# Patient Record
Sex: MALE | Race: WHITE | NOT HISPANIC OR LATINO | Employment: FULL TIME | URBAN - METROPOLITAN AREA
[De-identification: names, ages, dates, MRNs, and addresses within clinical notes are randomized per-mention and may not be internally consistent; named-entity substitution may affect disease eponyms.]

---

## 2022-05-19 ENCOUNTER — OFFICE VISIT (OUTPATIENT)
Dept: FAMILY MEDICINE | Facility: CLINIC | Age: 32
End: 2022-05-19
Payer: COMMERCIAL

## 2022-05-19 ENCOUNTER — LAB VISIT (OUTPATIENT)
Dept: LAB | Facility: HOSPITAL | Age: 32
End: 2022-05-19
Attending: FAMILY MEDICINE
Payer: COMMERCIAL

## 2022-05-19 ENCOUNTER — PATIENT MESSAGE (OUTPATIENT)
Dept: FAMILY MEDICINE | Facility: CLINIC | Age: 32
End: 2022-05-19

## 2022-05-19 VITALS
WEIGHT: 268.75 LBS | DIASTOLIC BLOOD PRESSURE: 72 MMHG | BODY MASS INDEX: 34.49 KG/M2 | SYSTOLIC BLOOD PRESSURE: 130 MMHG | HEART RATE: 82 BPM | HEIGHT: 74 IN | OXYGEN SATURATION: 96 %

## 2022-05-19 DIAGNOSIS — Z00.00 WELLNESS EXAMINATION: ICD-10-CM

## 2022-05-19 DIAGNOSIS — E66.9 OBESITY, CLASS I, BMI 30.0-34.9 (SEE ACTUAL BMI): ICD-10-CM

## 2022-05-19 DIAGNOSIS — F41.1 GAD (GENERALIZED ANXIETY DISORDER): ICD-10-CM

## 2022-05-19 DIAGNOSIS — E78.00 PURE HYPERCHOLESTEROLEMIA: Primary | ICD-10-CM

## 2022-05-19 DIAGNOSIS — E78.00 PURE HYPERCHOLESTEROLEMIA: ICD-10-CM

## 2022-05-19 LAB
ALBUMIN SERPL BCP-MCNC: 4.3 G/DL (ref 3.5–5.2)
ALP SERPL-CCNC: 51 U/L (ref 55–135)
ALT SERPL W/O P-5'-P-CCNC: 36 U/L (ref 10–44)
ANION GAP SERPL CALC-SCNC: 10 MMOL/L (ref 8–16)
AST SERPL-CCNC: 27 U/L (ref 10–40)
BASOPHILS # BLD AUTO: 0.05 K/UL (ref 0–0.2)
BASOPHILS NFR BLD: 0.7 % (ref 0–1.9)
BILIRUB SERPL-MCNC: 0.4 MG/DL (ref 0.1–1)
BUN SERPL-MCNC: 13 MG/DL (ref 6–20)
CALCIUM SERPL-MCNC: 10.2 MG/DL (ref 8.7–10.5)
CHLORIDE SERPL-SCNC: 103 MMOL/L (ref 95–110)
CHOLEST SERPL-MCNC: 262 MG/DL (ref 120–199)
CHOLEST/HDLC SERPL: 6.4 {RATIO} (ref 2–5)
CO2 SERPL-SCNC: 27 MMOL/L (ref 23–29)
CREAT SERPL-MCNC: 1 MG/DL (ref 0.5–1.4)
DIFFERENTIAL METHOD: NORMAL
EOSINOPHIL # BLD AUTO: 0.1 K/UL (ref 0–0.5)
EOSINOPHIL NFR BLD: 1.2 % (ref 0–8)
ERYTHROCYTE [DISTWIDTH] IN BLOOD BY AUTOMATED COUNT: 12.6 % (ref 11.5–14.5)
EST. GFR  (AFRICAN AMERICAN): >60 ML/MIN/1.73 M^2
EST. GFR  (NON AFRICAN AMERICAN): >60 ML/MIN/1.73 M^2
GLUCOSE SERPL-MCNC: 97 MG/DL (ref 70–110)
HCT VFR BLD AUTO: 44.6 % (ref 40–54)
HDLC SERPL-MCNC: 41 MG/DL (ref 40–75)
HDLC SERPL: 15.6 % (ref 20–50)
HGB BLD-MCNC: 14.6 G/DL (ref 14–18)
IMM GRANULOCYTES # BLD AUTO: 0.01 K/UL (ref 0–0.04)
IMM GRANULOCYTES NFR BLD AUTO: 0.1 % (ref 0–0.5)
LDLC SERPL CALC-MCNC: 157.6 MG/DL (ref 63–159)
LYMPHOCYTES # BLD AUTO: 1.5 K/UL (ref 1–4.8)
LYMPHOCYTES NFR BLD: 22.7 % (ref 18–48)
MCH RBC QN AUTO: 28.5 PG (ref 27–31)
MCHC RBC AUTO-ENTMCNC: 32.7 G/DL (ref 32–36)
MCV RBC AUTO: 87 FL (ref 82–98)
MONOCYTES # BLD AUTO: 0.6 K/UL (ref 0.3–1)
MONOCYTES NFR BLD: 9.4 % (ref 4–15)
NEUTROPHILS # BLD AUTO: 4.4 K/UL (ref 1.8–7.7)
NEUTROPHILS NFR BLD: 65.9 % (ref 38–73)
NONHDLC SERPL-MCNC: 221 MG/DL
NRBC BLD-RTO: 0 /100 WBC
PLATELET # BLD AUTO: 263 K/UL (ref 150–450)
PMV BLD AUTO: 11.5 FL (ref 9.2–12.9)
POTASSIUM SERPL-SCNC: 4.9 MMOL/L (ref 3.5–5.1)
PROT SERPL-MCNC: 8.3 G/DL (ref 6–8.4)
RBC # BLD AUTO: 5.12 M/UL (ref 4.6–6.2)
SODIUM SERPL-SCNC: 140 MMOL/L (ref 136–145)
TRIGL SERPL-MCNC: 317 MG/DL (ref 30–150)
WBC # BLD AUTO: 6.71 K/UL (ref 3.9–12.7)

## 2022-05-19 PROCEDURE — 99999 PR PBB SHADOW E&M-EST. PATIENT-LVL III: CPT | Mod: PBBFAC,,, | Performed by: FAMILY MEDICINE

## 2022-05-19 PROCEDURE — 3075F SYST BP GE 130 - 139MM HG: CPT | Mod: CPTII,S$GLB,, | Performed by: FAMILY MEDICINE

## 2022-05-19 PROCEDURE — 1159F MED LIST DOCD IN RCRD: CPT | Mod: CPTII,S$GLB,, | Performed by: FAMILY MEDICINE

## 2022-05-19 PROCEDURE — 80053 COMPREHEN METABOLIC PANEL: CPT | Performed by: FAMILY MEDICINE

## 2022-05-19 PROCEDURE — 99205 PR OFFICE/OUTPT VISIT, NEW, LEVL V, 60-74 MIN: ICD-10-PCS | Mod: S$GLB,,, | Performed by: FAMILY MEDICINE

## 2022-05-19 PROCEDURE — 80061 LIPID PANEL: CPT | Performed by: FAMILY MEDICINE

## 2022-05-19 PROCEDURE — 1159F PR MEDICATION LIST DOCUMENTED IN MEDICAL RECORD: ICD-10-PCS | Mod: CPTII,S$GLB,, | Performed by: FAMILY MEDICINE

## 2022-05-19 PROCEDURE — 3008F BODY MASS INDEX DOCD: CPT | Mod: CPTII,S$GLB,, | Performed by: FAMILY MEDICINE

## 2022-05-19 PROCEDURE — 99205 OFFICE O/P NEW HI 60 MIN: CPT | Mod: S$GLB,,, | Performed by: FAMILY MEDICINE

## 2022-05-19 PROCEDURE — 3078F PR MOST RECENT DIASTOLIC BLOOD PRESSURE < 80 MM HG: ICD-10-PCS | Mod: CPTII,S$GLB,, | Performed by: FAMILY MEDICINE

## 2022-05-19 PROCEDURE — 86803 HEPATITIS C AB TEST: CPT | Performed by: FAMILY MEDICINE

## 2022-05-19 PROCEDURE — 3008F PR BODY MASS INDEX (BMI) DOCUMENTED: ICD-10-PCS | Mod: CPTII,S$GLB,, | Performed by: FAMILY MEDICINE

## 2022-05-19 PROCEDURE — 85025 COMPLETE CBC W/AUTO DIFF WBC: CPT | Performed by: FAMILY MEDICINE

## 2022-05-19 PROCEDURE — 99999 PR PBB SHADOW E&M-EST. PATIENT-LVL III: ICD-10-PCS | Mod: PBBFAC,,, | Performed by: FAMILY MEDICINE

## 2022-05-19 PROCEDURE — 3075F PR MOST RECENT SYSTOLIC BLOOD PRESS GE 130-139MM HG: ICD-10-PCS | Mod: CPTII,S$GLB,, | Performed by: FAMILY MEDICINE

## 2022-05-19 PROCEDURE — 3078F DIAST BP <80 MM HG: CPT | Mod: CPTII,S$GLB,, | Performed by: FAMILY MEDICINE

## 2022-05-19 PROCEDURE — 36415 COLL VENOUS BLD VENIPUNCTURE: CPT | Mod: PO | Performed by: FAMILY MEDICINE

## 2022-05-19 PROCEDURE — 87389 HIV-1 AG W/HIV-1&-2 AB AG IA: CPT | Performed by: FAMILY MEDICINE

## 2022-05-19 RX ORDER — SERTRALINE HYDROCHLORIDE 100 MG/1
100 TABLET, FILM COATED ORAL DAILY
COMMUNITY
Start: 2022-04-20 | End: 2022-05-19

## 2022-05-19 RX ORDER — ROSUVASTATIN CALCIUM 20 MG/1
20 TABLET, COATED ORAL DAILY
Qty: 90 TABLET | Refills: 3 | Status: SHIPPED | OUTPATIENT
Start: 2022-05-19 | End: 2023-06-02

## 2022-05-19 RX ORDER — FINASTERIDE 1 MG/1
TABLET, FILM COATED ORAL
COMMUNITY
Start: 2018-01-19 | End: 2022-07-18 | Stop reason: SDUPTHER

## 2022-05-19 RX ORDER — ATORVASTATIN CALCIUM 20 MG/1
20 TABLET, FILM COATED ORAL DAILY
COMMUNITY
Start: 2022-03-15 | End: 2022-05-19

## 2022-05-19 RX ORDER — FLUOXETINE HYDROCHLORIDE 40 MG/1
40 CAPSULE ORAL DAILY
Qty: 90 CAPSULE | Refills: 3 | Status: SHIPPED | OUTPATIENT
Start: 2022-05-19 | End: 2023-06-02

## 2022-05-19 RX ORDER — ALPRAZOLAM 0.25 MG/1
TABLET ORAL
COMMUNITY
Start: 2018-01-19 | End: 2022-10-14 | Stop reason: SDUPTHER

## 2022-05-19 NOTE — PROGRESS NOTES
HPI: Here to establish care. He works in wealth management. He has been  Taking statin for elevated cholesterol and weight concerns. He would like to see a dietician.   He has SALLY, with a rare panic attack. Xanax only as needed. We discussed Prozac over Zoloft for weight management.         Review of Systems   Constitutional: Negative.    HENT: Negative.    Eyes: Negative.    Respiratory: Negative.    Cardiovascular: Negative.    Gastrointestinal: Negative.    Genitourinary: Negative.    Musculoskeletal: Negative.    Skin: Negative.    Neurological: Negative.    Psychiatric/Behavioral: Negative.         Physical Exam  Constitutional:       Appearance: Normal appearance.   HENT:      Head: Normocephalic and atraumatic.      Nose: Nose normal.      Mouth/Throat:      Mouth: Mucous membranes are dry.   Eyes:      Extraocular Movements: Extraocular movements intact.      Pupils: Pupils are equal, round, and reactive to light.   Cardiovascular:      Rate and Rhythm: Normal rate and regular rhythm.      Pulses: Normal pulses.      Heart sounds: Normal heart sounds.   Pulmonary:      Breath sounds: Normal breath sounds.   Abdominal:      General: Abdomen is flat. Bowel sounds are normal.      Palpations: Abdomen is soft.   Musculoskeletal:         General: Normal range of motion.      Cervical back: Normal range of motion and neck supple.   Skin:     General: Skin is warm and dry.   Neurological:      General: No focal deficit present.      Mental Status: He is alert and oriented to person, place, and time.   Psychiatric:         Mood and Affect: Mood normal.         Behavior: Behavior normal.         Thought Content: Thought content normal.         Judgment: Judgment normal.          Pure hypercholesterolemia  -     CBC Auto Differential; Future; Expected date: 05/19/2022  -     Comprehensive Metabolic Panel; Future; Expected date: 05/19/2022  -     Lipid Panel; Future; Expected date: 05/19/2022  -     Ambulatory  referral/consult to Nutrition Services; Future; Expected date: 05/26/2022    Obesity, Class I, BMI 30.0-34.9 (see actual BMI)  -     Ambulatory referral/consult to Nutrition Services; Future; Expected date: 05/26/2022    Wellness examination  -     Hepatitis C Antibody; Future; Expected date: 05/19/2022  -     HIV 1/2 Ag/Ab (4th Gen); Future; Expected date: 05/19/2022    SALLY (generalized anxiety disorder)  -     FLUoxetine 40 MG capsule; Take 1 capsule (40 mg total) by mouth once daily.  Dispense: 90 capsule; Refill: 3

## 2022-05-19 NOTE — PROGRESS NOTES
HPI: Here to establish care. He works in wealth management. He has been  Taking statin for elevated cholesterol and weight concerns. He would like to see a dietician.           Review of Systems   Constitutional: Negative.    HENT: Negative.    Eyes: Negative.    Respiratory: Negative.    Cardiovascular: Negative.    Gastrointestinal: Negative.    Genitourinary: Negative.    Musculoskeletal: Negative.    Skin: Negative.    Neurological: Negative.    Psychiatric/Behavioral: Negative.         Physical Exam  Constitutional:       Appearance: Normal appearance.   HENT:      Head: Normocephalic and atraumatic.      Nose: Nose normal.      Mouth/Throat:      Mouth: Mucous membranes are dry.   Eyes:      Extraocular Movements: Extraocular movements intact.      Pupils: Pupils are equal, round, and reactive to light.   Cardiovascular:      Rate and Rhythm: Normal rate and regular rhythm.      Pulses: Normal pulses.      Heart sounds: Normal heart sounds.   Pulmonary:      Breath sounds: Normal breath sounds.   Abdominal:      General: Abdomen is flat. Bowel sounds are normal.      Palpations: Abdomen is soft.   Musculoskeletal:         General: Normal range of motion.      Cervical back: Normal range of motion and neck supple.   Skin:     General: Skin is warm and dry.   Neurological:      General: No focal deficit present.      Mental Status: He is alert and oriented to person, place, and time.   Psychiatric:         Mood and Affect: Mood normal.         Behavior: Behavior normal.         Thought Content: Thought content normal.         Judgment: Judgment normal.          Pure hypercholesterolemia  -     CBC Auto Differential; Future; Expected date: 05/19/2022  -     Comprehensive Metabolic Panel; Future; Expected date: 05/19/2022  -     Lipid Panel; Future; Expected date: 05/19/2022    Obesity, Class I, BMI 30.0-34.9 (see actual BMI)    Wellness examination  -     Hepatitis C Antibody; Future; Expected date: 05/19/2022  -      HIV 1/2 Ag/Ab (4th Gen); Future; Expected date: 05/19/2022  -     Ambulatory referral/consult to Nutrition Services; Future; Expected date: 05/26/2022

## 2022-05-23 LAB — HIV 1+2 AB+HIV1 P24 AG SERPL QL IA: NEGATIVE

## 2022-05-24 LAB — HCV AB SERPL QL IA: NEGATIVE

## 2022-07-18 ENCOUNTER — PATIENT MESSAGE (OUTPATIENT)
Dept: FAMILY MEDICINE | Facility: CLINIC | Age: 32
End: 2022-07-18
Payer: COMMERCIAL

## 2022-07-18 RX ORDER — FINASTERIDE 1 MG/1
1 TABLET, FILM COATED ORAL DAILY
Qty: 90 TABLET | Refills: 3 | Status: SHIPPED | OUTPATIENT
Start: 2022-07-18

## 2022-07-18 NOTE — TELEPHONE ENCOUNTER
No new care gaps identified.  [facility] Embedded Care Gaps. Reference number: [id number]. 7/18/2022   1:02:37 PM CDT

## 2022-10-14 ENCOUNTER — PATIENT MESSAGE (OUTPATIENT)
Dept: FAMILY MEDICINE | Facility: CLINIC | Age: 32
End: 2022-10-14
Payer: COMMERCIAL

## 2022-10-14 NOTE — TELEPHONE ENCOUNTER
No new care gaps identified.  [facility] Embedded Care Gaps. Reference number: [id number]. 10/14/2022   3:47:47 PM CDT

## 2022-10-15 RX ORDER — ALPRAZOLAM 0.25 MG/1
TABLET ORAL
Qty: 60 TABLET | Refills: 0 | Status: SHIPPED | OUTPATIENT
Start: 2022-10-15

## 2022-11-02 ENCOUNTER — PATIENT OUTREACH (OUTPATIENT)
Dept: ADMINISTRATIVE | Facility: HOSPITAL | Age: 32
End: 2022-11-02
Payer: COMMERCIAL

## 2022-11-02 NOTE — PROGRESS NOTES
2022 Care Everywhere updates requested and reviewed.  Immunizations reconciled. Media reports reviewed.  Duplicate HM overrides and  orders removed.  Overdue HM topic chart audit and/or requested.  Overdue lab testing linked to upcoming lab appointments if applies.  The following record(s)  below were uploaded for Health Maintenance .    IMMUNIZATIONS    Health Maintenance Due   Topic Date Due    TETANUS VACCINE  Never done    COVID-19 Vaccine (3 - Booster for Moderna series) 2021

## 2022-11-17 ENCOUNTER — PATIENT MESSAGE (OUTPATIENT)
Dept: OTOLARYNGOLOGY | Facility: CLINIC | Age: 32
End: 2022-11-17

## 2022-11-17 ENCOUNTER — OFFICE VISIT (OUTPATIENT)
Dept: OTOLARYNGOLOGY | Facility: CLINIC | Age: 32
End: 2022-11-17
Payer: COMMERCIAL

## 2022-11-17 ENCOUNTER — TELEPHONE (OUTPATIENT)
Dept: FAMILY MEDICINE | Facility: CLINIC | Age: 32
End: 2022-11-17
Payer: COMMERCIAL

## 2022-11-17 DIAGNOSIS — H68.013 ACUTE EUSTACHIAN SALPINGITIS, BILATERAL: ICD-10-CM

## 2022-11-17 DIAGNOSIS — R09.81 NASAL CONGESTION: ICD-10-CM

## 2022-11-17 DIAGNOSIS — J30.2 ACUTE SEASONAL ALLERGIC RHINITIS: Primary | ICD-10-CM

## 2022-11-17 PROCEDURE — 1159F PR MEDICATION LIST DOCUMENTED IN MEDICAL RECORD: ICD-10-PCS | Mod: CPTII,95,, | Performed by: OTOLARYNGOLOGY

## 2022-11-17 PROCEDURE — 99204 PR OFFICE/OUTPT VISIT, NEW, LEVL IV, 45-59 MIN: ICD-10-PCS | Mod: 95,,, | Performed by: OTOLARYNGOLOGY

## 2022-11-17 PROCEDURE — 99204 OFFICE O/P NEW MOD 45 MIN: CPT | Mod: 95,,, | Performed by: OTOLARYNGOLOGY

## 2022-11-17 PROCEDURE — 1160F PR REVIEW ALL MEDS BY PRESCRIBER/CLIN PHARMACIST DOCUMENTED: ICD-10-PCS | Mod: CPTII,95,, | Performed by: OTOLARYNGOLOGY

## 2022-11-17 PROCEDURE — 1160F RVW MEDS BY RX/DR IN RCRD: CPT | Mod: CPTII,95,, | Performed by: OTOLARYNGOLOGY

## 2022-11-17 PROCEDURE — 1159F MED LIST DOCD IN RCRD: CPT | Mod: CPTII,95,, | Performed by: OTOLARYNGOLOGY

## 2022-11-17 RX ORDER — METHYLPREDNISOLONE 4 MG/1
TABLET ORAL
Qty: 21 EACH | Refills: 0 | Status: SHIPPED | OUTPATIENT
Start: 2022-11-17 | End: 2022-11-28

## 2022-11-17 NOTE — TELEPHONE ENCOUNTER
Pt. Confirmed cancelled appt. With Dr. Flower on 11/21/22.  He states that he will call to schedule at a later date due to his wife expected to deliver twins in the next 1-2 weeks.

## 2022-11-17 NOTE — PROGRESS NOTES
[facility]    Subjective:      Patient: Jose Manuel Coronel Patient PCP: Fernando Flower MD         :  1990     Sex:  male      MRN:  [id number]          Date of Visit: 2022      Chief Complaint: Sinus complaints      Patient ID: Jose Manuel Coronel is a 32 y.o. male lifelong NON-smoker with HLD, NO hypertension or immunosuppression, seasonal nasal drip and congestion with mouth breathing and dryness starting over the weekend.  Seen at  on Monday. Negative Flu and Covid. Provider deferred steroids per patient in favor of conservative care. Taking Flonase and Dayquil. Worsening sore throat mouth breathing and ear pressure.  No focal facial pressure/pain.  No purulence or fevers.  6 days into illness.  Typically occurs this time of year with weather change.  No allergy testing or imaging.    Review of Systems     Past Medical History  He has a past medical history of Anxiety, Depression, and Genital herpes.    Family / Surgical / Social History  His family history includes Anxiety disorder in his mother; Depression in his father and mother; Diabetes in his maternal grandfather, maternal grandmother, paternal grandfather, and paternal grandmother.    History reviewed. No pertinent surgical history.    Social History     Tobacco Use    Smoking status: Never    Smokeless tobacco: Never   Substance and Sexual Activity    Alcohol use: Yes     Alcohol/week: 4.0 standard drinks     Types: 4 Cans of beer per week    Drug use: No    Sexual activity: Not on file       Medications  He has a current medication list which includes the following prescription(s): alprazolam, finasteride, fluoxetine, and rosuvastatin.      Allergies  Review of patient's allergies indicates:  No Known Allergies    All medications, allergies, and past history have been reviewed.    Objective:      Vitals:  Vitals - 1 value per visit 2018   SYSTOLIC - - 130   DIASTOLIC - - 72   Pulse 62 - 82   Temp - - -   Resp - - -   SPO2 - - 96    Weight (lb) - - 268.74   Weight (kg) - - 121.9   Height - - 74   BMI (Calculated) - - 34.5   VISIT REPORT - - -   Pain Score  - 0 -       There is no height or weight on file to calculate BSA.    Physical Exam:    GENERAL  APPEARANCE -  alert, appears stated age, and cooperative  BARRIER(S) TO COMMUNICATION -  none VOICE - appropriate for age and gender      Procedure(s):  None    Labs:  WBC   Date Value Ref Range Status   05/19/2022 6.71 3.90 - 12.70 K/uL Final     Hemoglobin   Date Value Ref Range Status   05/19/2022 14.6 14.0 - 18.0 g/dL Final     Platelets   Date Value Ref Range Status   05/19/2022 263 150 - 450 K/uL Final     Creatinine   Date Value Ref Range Status   05/19/2022 1.0 0.5 - 1.4 mg/dL Final     Glucose   Date Value Ref Range Status   05/19/2022 97 70 - 110 mg/dL Final         Assessment:      Problem List Items Addressed This Visit    None  Visit Diagnoses       Acute seasonal allergic rhinitis    -  Primary    Nasal congestion        Acute Eustachian salpingitis, bilateral                     Plan:        Acute aggressive treatment including an oral steroid try and prevent secondary bacterial infection as discussed.  Afrin regimen as outlined and NO LONGER, continue with Flonase and as needed oral decongestants.  Add the Medrol Dosepak as prescribed.  No indication for antibiotics at this time but a symptoms worsen or persist beyond 10-14 days antibiotic treatment may be appropriate.    Allergy evaluation and immunotherapy may be appropriate if this continues to be a worsening seasonal problem.  Identifying the seasonal trigger and aggressively treating with nasal steroids and a short course of decongestants with lots of saline rinses in the future may be beneficial.      Return with any worsening of symptoms, failure to improve, or any other concerns for further evaluation and treatment.    Answers submitted by the patient for this visit:  Review of Symptoms Questionnaire  (Submitted on  11/17/2022)  Fatigue (Tiredness)?: Yes  Seasonal Allergies?: Yes

## 2022-11-17 NOTE — PATIENT INSTRUCTIONS
Acute aggressive treatment including an oral steroid try and prevent secondary bacterial infection as discussed.  Afrin regimen as outlined and NO LONGER, continue with Flonase and as needed oral decongestants.  Add the Medrol Dosepak as prescribed.  No indication for antibiotics at this time but a symptoms worsen or persist beyond 10-14 days antibiotic treatment may be appropriate.    Allergy evaluation and immunotherapy may be appropriate if this continues to be a worsening seasonal problem.  Identifying the seasonal trigger and aggressively treating with nasal steroids and a short course of decongestants with lots of saline rinses in the future may be beneficial.      Return with any worsening of symptoms, failure to improve, or any other concerns for further evaluation and treatment.    AFRIN/OXYMETAZOLINE NASAL DECONGESTANT REGIMEN    Afrin decongestant nasal spray 2 sprays towards the ear each side twice daily for 3 days, stop for 1 day, return for 3 more days then discontinue use entirely.        INTRANASAL STEROID SPRAYS      Intranasal steroid sprays are available both by prescription and over-the-counter both in generic and brand name preparations.  They are all very similar in efficacy and side effect profiles.  Over-the-counter and prescription intranasal steroids include fluticasone propionate (Flonase), fluticasone furoate (Sensimist), triamcinolone (Nasacort), and budesonide (Rhinocort).  While these medications are available as prescriptions as well there are few nasal steroids in her available by prescription only and include mometasone (Nasonex), flunisolide (Nasarel), and beclomethasone (QNASL).    Nasal steroids or the foundation of treatment of both allergy and other inflammatory conditions of the nose and sinuses.  They are safe for regular use and while there are many side effects listed most of these are steroid class effects and not typically encountered.  Typical side effects include  dryness and even ulceration and bleeding of the nose.  These side effects can be minimized by proper application and proper moisturization with saline and saline gel.    Sometimes changing between 1 brand of nasal steroid and another can result in improved control of symptoms especially after long term use of one particular nasal steroid.    Proper application of the nasal steroid spray is accomplished by spraying towards the I/ear on the same side with the tip of the superior just barely inside the nostril with the chin slightly downward.  Any dripping should be gently inhaled not sniff test backwards into the throat.  Labeled instructions should be followed.      NASAL SALINE    Still saline comes in many preparations including sprays/mists, gels, and rinses.  Different preparations served different purposes.  Saline spray helps to briefly moisturize the nose and help clear mucus.  Saline gels coat the nose for longer protective benefit of keeping the linings the nose moist.  Saline rinses clear the nose and sinuses and a more thorough way in her best used for significant postnasal drip and sinus complaints.  A combination of saline sprays/mists, gels and rinses should be used to address routine nasal clearing and dryness issues as well as flushing for better control of allergy and postnasal drip symptoms.  There is no real risk of over use of nasal saline products.  Saline sprays do not have any of the potential rebound or addiction of nasal decongestant sprays.  Nasal saline sprays and rinses should be used prior to the application of any medicated nasal sprays such as nasal steroids or nasal antihistamine sprays.       SINUS RINSE INSTRUCTIONS    Nasal Saline Irrigation Instructions  You can wash your nasal and sinus passages using nasal saline (salt water) irrigation. This   is simple and effective. Follow the instructions below, as well as the ones provided by your   physician.  Supplies  First, you will  need a nasal saline irrigation bottle and rinse solution.   You can purchase nasal rinse kits that include these items (such as   NeilMed®, Ayr®, Simply Saline®, Ocean Complete®) at most drug   stores. You can also make your own saline irrigation solution by   adding kosher (non-iodine) salt and baking soda to distilled water.   Your physician may tell you to add medications like a steroid or   antibiotic to the rinse as needed.  Steps for nasal irrigation  Step 1. Fill the bottle  ? Wash your hands.  ? Fill the irrigation bottle with lukewarm distilled water or boiled water that has cooled.  Step 2. Mix the solution  ? Put the saline and salt packet contents into the bottle.  ? Tighten the top of the bottle and shake it gently to dissolve the mixture.  ? If you are making your own solution:   - Add 1/4 to 1/2 teaspoon of baking soda and 1/8 teaspoon of kosher (non-iodine) salt   into the bottle.   - Tighten the top of the bottle.   - Shake the bottle gently to dissolve the mixture.  Step 3. Get into position  ?  front of the sink.  ? Unless you were instructed to use another position, bend forward.   Then tilt your face down about 45 degrees so that you are looking   down into the sink.  ? Gently place the spout of the saline bottle against 1 of your nostrils.  [facility]  CARE AND TREATMENT  Patient Education  ©2018 [employer]  ©2018 [employer]  Step 4. Rinse  ? Breathing through your mouth, gently squeeze the   bottle. This will squirt the solution into your nostril. The   solution will start to drain from the other nostril. Some   may drain from your mouth. This is normal.  ? Use 2 ounces (half of the bottle) on each nostril.  ? Afterwards, you may need to blow your nose gently to   help drain any solution that is left behind.  Step 5. Repeat  ? Repeat steps 3 and 4 with the other nostril.  You can watch a video to learn how to do nasal saline  irrigation. Go to "[employer]".com and   search for NeilMed Sinus Rinse.  Step 6.  Clean the bottle and cap. Air dry the Sinus Rinse bottle, cap, and tube on a clean paper towel, a lint free towel, or use NeilMed® NasaDOCK® or NasaDOCK plus (sold separately) to store the bottle, cap and tube.  Please read Warnings before using.  Our recommendation is to replace the bottle every three months.      NEILMED MIN INSTRUCTIONS    It is very important to keep these devices clean and free from any contamination. Replace the bottle every 3 months.  NasaDock Plus  NasaDock NeilMed® SINUS RINSE Squeeze Bottle: Please perform routine inspections of the bottle and tube for any discolorations and cracks. If there are any visual signs of deterioration or permanent color changes, please clean thoroughly. If the discolorations remain after cleansing, discard the items and purchase new ones. Please follow these instructions after each use of the product. Be sure to replace your product after three months.  Step 1: Rinse the cap, tube and bottle using running water. Fill the bottle with distilled, micro-filtered (through 0.2 micron), reverse osmosis filtered, commercially bottled or previously boiled and cooled down water at lukewarm or body temperature..  Step 2: Add a few drops of dish washing liquid or baby shampoo.  Step 3: Attach the cap and tube to the bottle; hold your finger over the opening in the cap and shake the bottle vigorously.  Step 4: Squeeze the bottle hard to allow the soapy solution to clean the interior of the tube and the cap. Empty out the bottle completely.  Step 5: Rinse the soap from the bottle, cap and tube thoroughly and place the items on a clean paper towel to dry or use the preferred NasaDOCK® or NasaDOCK plus.    The NasaDOCK® is a simple, hygienic way to dry and store the SINUS RINSE bottle, cap and tube. NasaDOCK® comes with various hanging options and is available in different colors. Our  [employer] model also offers storage for our SINUS RINSE mixture packets. We strongly suggest using NasaDOCK® as an inexpensive, easy way to dry the cap, tube and SINUS RINSE bottle.        Cleaning:  Do not use a  to clean the inside of a bottle. While our bottle is  safe, a  will not adequately clean the SINUS RINSE bottle. The water jets in dishwashers cannot enter the narrow neck of the bottle, and portions of the bottles interior will not be cleaned thoroughly. Additional methods of cleaning the bottle include the use of concentrated white vinegar or isopropyl alcohol (70% concentration), followed by scrubbing and rinsing as described above.       Microwave Disinfection  Clean the device with soap and water as mentioned above and shake off the excess water. Now place the bottle, cap and tube in the microwave for 40 seconds. This will disinfect the bottle, cap and tube. If the microwave has been used recently, please make sure that the inside of the microwave has cooled back down to room temperature before using it to disinfect the bottle.    NeilMed NasaFlo® Neti Pot Users:  Use the same procedure as above.    Sinugator® Cleaning Directions:  Clean the Sinugator® by running plain water and dry with a clean lint free towel and then air dry the unit by keeping it open to the air. The nasal  tip, blue reservoir and white soft tube can be disinfected by cleaning with soap and water and shaking off the excess water before placing in the home microwave for 60 seconds. Clean the entire unit with a few drops of dishwashing liquid and water every three days to keep the unit clean. As a fi nal rinse to wash off any residual soap or tap water, use either distilled, micro-filtered (through 0.2 micron filter), commercially bottled or previously boiled & cooled down water. Please make sure to rinse thoroughly during each wash so no soap is left behind. DO NOT place the white  motor unit in microwave for disinfection. Because of the units stainless steel components, this can cause damage or fire hazards.    General Principles of Maintenance & Storage:  When permissible use a microwave periodically to disinfect devices. Always store NeilMed® products in a cool and dry place with adequate ventilation. NasaDOCK® or NasaDOCK plus offer a simple hygienic way to air dry & neatly store the bottle, cap, tube and NasaFlo. Do not store the bottle with the cap screwed on, unless both are dry. Do not store the wet parts in a sealed plastic bag. If you travel before they are dry, wrap parts separately in paper towels. Hand soap or shampoo can be used for cleaning parts while away from home.        USE ONLY AS DIRECTED, IF SYMPTOMS PERSIST SEE YOUR DOCTOR/HEALTHCARE PROFESSIONAL. ALWAYS READ THE LABEL.

## 2022-11-28 ENCOUNTER — OFFICE VISIT (OUTPATIENT)
Dept: FAMILY MEDICINE | Facility: CLINIC | Age: 32
End: 2022-11-28
Payer: COMMERCIAL

## 2022-11-28 VITALS
OXYGEN SATURATION: 98 % | DIASTOLIC BLOOD PRESSURE: 82 MMHG | HEART RATE: 83 BPM | SYSTOLIC BLOOD PRESSURE: 128 MMHG | WEIGHT: 248.44 LBS | BODY MASS INDEX: 30.89 KG/M2 | HEIGHT: 75 IN

## 2022-11-28 DIAGNOSIS — E78.00 PURE HYPERCHOLESTEROLEMIA: ICD-10-CM

## 2022-11-28 DIAGNOSIS — Z00.00 WELLNESS EXAMINATION: Primary | ICD-10-CM

## 2022-11-28 DIAGNOSIS — E66.09 CLASS 1 OBESITY DUE TO EXCESS CALORIES WITH SERIOUS COMORBIDITY AND BODY MASS INDEX (BMI) OF 31.0 TO 31.9 IN ADULT: ICD-10-CM

## 2022-11-28 PROCEDURE — 99212 OFFICE O/P EST SF 10 MIN: CPT | Mod: S$GLB,,, | Performed by: STUDENT IN AN ORGANIZED HEALTH CARE EDUCATION/TRAINING PROGRAM

## 2022-11-28 PROCEDURE — 99999 PR PBB SHADOW E&M-EST. PATIENT-LVL III: CPT | Mod: PBBFAC,,, | Performed by: STUDENT IN AN ORGANIZED HEALTH CARE EDUCATION/TRAINING PROGRAM

## 2022-11-28 PROCEDURE — 3008F PR BODY MASS INDEX (BMI) DOCUMENTED: ICD-10-PCS | Mod: CPTII,S$GLB,, | Performed by: STUDENT IN AN ORGANIZED HEALTH CARE EDUCATION/TRAINING PROGRAM

## 2022-11-28 PROCEDURE — 3079F DIAST BP 80-89 MM HG: CPT | Mod: CPTII,S$GLB,, | Performed by: STUDENT IN AN ORGANIZED HEALTH CARE EDUCATION/TRAINING PROGRAM

## 2022-11-28 PROCEDURE — 1159F PR MEDICATION LIST DOCUMENTED IN MEDICAL RECORD: ICD-10-PCS | Mod: CPTII,S$GLB,, | Performed by: STUDENT IN AN ORGANIZED HEALTH CARE EDUCATION/TRAINING PROGRAM

## 2022-11-28 PROCEDURE — 3074F SYST BP LT 130 MM HG: CPT | Mod: CPTII,S$GLB,, | Performed by: STUDENT IN AN ORGANIZED HEALTH CARE EDUCATION/TRAINING PROGRAM

## 2022-11-28 PROCEDURE — 3079F PR MOST RECENT DIASTOLIC BLOOD PRESSURE 80-89 MM HG: ICD-10-PCS | Mod: CPTII,S$GLB,, | Performed by: STUDENT IN AN ORGANIZED HEALTH CARE EDUCATION/TRAINING PROGRAM

## 2022-11-28 PROCEDURE — 3008F BODY MASS INDEX DOCD: CPT | Mod: CPTII,S$GLB,, | Performed by: STUDENT IN AN ORGANIZED HEALTH CARE EDUCATION/TRAINING PROGRAM

## 2022-11-28 PROCEDURE — 3074F PR MOST RECENT SYSTOLIC BLOOD PRESSURE < 130 MM HG: ICD-10-PCS | Mod: CPTII,S$GLB,, | Performed by: STUDENT IN AN ORGANIZED HEALTH CARE EDUCATION/TRAINING PROGRAM

## 2022-11-28 PROCEDURE — 99212 PR OFFICE/OUTPT VISIT, EST, LEVL II, 10-19 MIN: ICD-10-PCS | Mod: S$GLB,,, | Performed by: STUDENT IN AN ORGANIZED HEALTH CARE EDUCATION/TRAINING PROGRAM

## 2022-11-28 PROCEDURE — 1159F MED LIST DOCD IN RCRD: CPT | Mod: CPTII,S$GLB,, | Performed by: STUDENT IN AN ORGANIZED HEALTH CARE EDUCATION/TRAINING PROGRAM

## 2022-11-28 PROCEDURE — 99999 PR PBB SHADOW E&M-EST. PATIENT-LVL III: ICD-10-PCS | Mod: PBBFAC,,, | Performed by: STUDENT IN AN ORGANIZED HEALTH CARE EDUCATION/TRAINING PROGRAM

## 2022-11-28 NOTE — PROGRESS NOTES
Name: Jose Manuel Coronel  MRN: [id number]  : 1990  PCP: Fernando Flower MD    HPI  Here for [facility] Screening. Cholesterol panel collected earlier this year showing elevated levels - Dr. Flower has since placed patient on Crestor. Patient has also lost weight since seeing Dr. Flower.     Review of Systems   Cardiovascular:  Negative for palpitations and leg swelling.   Gastrointestinal:  Negative for abdominal pain, nausea and vomiting.   Musculoskeletal:  Negative for myalgias.   Neurological:  Negative for dizziness.     Patient Active Problem List   Diagnosis    Depressive disorder    Anxiety    Overweight       Vitals:    22 1540   BP: 128/82   Pulse: 83       Physical Exam  Constitutional:       General: He is not in acute distress.     Appearance: Normal appearance. He is well-developed.   HENT:      Head: Normocephalic and atraumatic.      Right Ear: External ear normal.      Left Ear: External ear normal.   Eyes:      Conjunctiva/sclera: Conjunctivae normal.   Pulmonary:      Effort: Pulmonary effort is normal. No respiratory distress.   Abdominal:      General: Abdomen is flat. There is no distension.      Comments: Waist circumference - 43 inches   Musculoskeletal:         General: No swelling or deformity.      Right lower leg: No edema.      Left lower leg: No edema.   Skin:     General: Skin is warm and dry.      Coloration: Skin is not jaundiced.   Neurological:      Mental Status: He is alert and oriented to person, place, and time. Mental status is at baseline.   Psychiatric:         Attention and Perception: Attention and perception normal.         Mood and Affect: Mood normal.         Speech: Speech normal.         Behavior: Behavior normal. Behavior is cooperative.         Thought Content: Thought content normal.         Cognition and Memory: Cognition normal.         Judgment: Judgment normal.       1. Wellness examination    2. Pure hypercholesterolemia  -     Lipid Panel;  Future; Expected date: 11/28/2022    3. Class 1 obesity due to excess calories with serious comorbidity and body mass index (BMI) of 31.0 to 31.9 in adult  -     Hemoglobin A1C; Future  -     Glucose, Fasting; Future; Expected date: 11/28/2022      Follow up as previously scheduled    Tulio Cifuentes MD  11/28/2022

## 2022-11-29 ENCOUNTER — LAB VISIT (OUTPATIENT)
Dept: LAB | Facility: HOSPITAL | Age: 32
End: 2022-11-29
Payer: COMMERCIAL

## 2022-11-29 DIAGNOSIS — E66.09 CLASS 1 OBESITY DUE TO EXCESS CALORIES WITH SERIOUS COMORBIDITY AND BODY MASS INDEX (BMI) OF 31.0 TO 31.9 IN ADULT: ICD-10-CM

## 2022-11-29 DIAGNOSIS — E78.00 PURE HYPERCHOLESTEROLEMIA: ICD-10-CM

## 2022-11-29 LAB
CHOLEST SERPL-MCNC: 178 MG/DL (ref 120–199)
CHOLEST/HDLC SERPL: 4.2 {RATIO} (ref 2–5)
ESTIMATED AVG GLUCOSE: 105 MG/DL (ref 68–131)
GLUCOSE SERPL-MCNC: 95 MG/DL (ref 70–110)
HBA1C MFR BLD: 5.3 % (ref 4–5.6)
HDLC SERPL-MCNC: 42 MG/DL (ref 40–75)
HDLC SERPL: 23.6 % (ref 20–50)
LDLC SERPL CALC-MCNC: 99.2 MG/DL (ref 63–159)
NONHDLC SERPL-MCNC: 136 MG/DL
TRIGL SERPL-MCNC: 184 MG/DL (ref 30–150)

## 2022-11-29 PROCEDURE — 82947 ASSAY GLUCOSE BLOOD QUANT: CPT | Performed by: STUDENT IN AN ORGANIZED HEALTH CARE EDUCATION/TRAINING PROGRAM

## 2022-11-29 PROCEDURE — 36415 COLL VENOUS BLD VENIPUNCTURE: CPT | Mod: PO | Performed by: STUDENT IN AN ORGANIZED HEALTH CARE EDUCATION/TRAINING PROGRAM

## 2022-11-29 PROCEDURE — 83036 HEMOGLOBIN GLYCOSYLATED A1C: CPT | Performed by: STUDENT IN AN ORGANIZED HEALTH CARE EDUCATION/TRAINING PROGRAM

## 2022-11-29 PROCEDURE — 80061 LIPID PANEL: CPT | Performed by: STUDENT IN AN ORGANIZED HEALTH CARE EDUCATION/TRAINING PROGRAM

## 2023-06-02 DIAGNOSIS — F41.1 GAD (GENERALIZED ANXIETY DISORDER): ICD-10-CM

## 2023-06-02 DIAGNOSIS — E78.00 PURE HYPERCHOLESTEROLEMIA: ICD-10-CM

## 2023-06-02 RX ORDER — FLUOXETINE HYDROCHLORIDE 40 MG/1
CAPSULE ORAL
Qty: 90 CAPSULE | Refills: 0 | Status: SHIPPED | OUTPATIENT
Start: 2023-06-02 | End: 2023-09-10

## 2023-06-02 RX ORDER — ROSUVASTATIN CALCIUM 20 MG/1
TABLET, COATED ORAL
Qty: 90 TABLET | Refills: 3 | Status: SHIPPED | OUTPATIENT
Start: 2023-06-02

## 2023-06-02 NOTE — TELEPHONE ENCOUNTER
Care Due:                  Date            Visit Type   Department     Provider  --------------------------------------------------------------------------------                                EP -                              PRIMARY      [facility]  Last Visit: 11-      CARE ([facility])   MEDICINE       Tulio Cifuentes  Next Visit: None Scheduled  None         None Found                                                            Last  Test          Frequency    Reason                     Performed    Due Date  --------------------------------------------------------------------------------    CMP.........  12 months..  rosuvastatin.............  05-   05-    [facility] Embedded Care Due Messages. Reference number: [id number].   6/02/2023 2:18:49 AM CDT

## 2023-06-02 NOTE — TELEPHONE ENCOUNTER
Refill Routing Note   Medication(s) are not appropriate for processing by [facility] for the following reason(s):      Required labs outdated    [facility] action(s):  Defer Labs due          Appointments  past 12m or future 3m with PCP    Date Provider   Last Visit   5/19/2022 Fernando Flower MD   Next Visit   Visit date not found Fernando Flower MD   ED visits in past 90 days: 0        Note composed:9:07 AM 06/02/2023

## 2023-09-10 DIAGNOSIS — F41.1 GAD (GENERALIZED ANXIETY DISORDER): ICD-10-CM

## 2023-09-10 RX ORDER — FLUOXETINE HYDROCHLORIDE 40 MG/1
CAPSULE ORAL
Qty: 30 CAPSULE | Refills: 3 | Status: SHIPPED | OUTPATIENT
Start: 2023-09-10 | End: 2024-01-12

## 2023-09-10 NOTE — TELEPHONE ENCOUNTER
Care Due:                  Date            Visit Type   Department     Provider  --------------------------------------------------------------------------------                                EP -                              PRIMARY      [facility] FAM[facility]  Last Visit: 11-      CARE ([facility])   MEDICINE       Tulio Cifuentes  Next Visit: None Scheduled  None         None Found                                                            Last  Test          Frequency    Reason                     Performed    Due Date  --------------------------------------------------------------------------------    Office Visit  12 months..  FLUoxetine, finasteride,   11- 11-                             rosuvastatin.............    CMP.........  12 months..  rosuvastatin.............  05-   05-    Lipid Panel.  12 months..  rosuvastatin.............  11- 11-    [facility] Embedded Care Due Messages. Reference number: [id number].   9/10/2023 1:21:06 AM CDT

## 2023-12-18 ENCOUNTER — ON-DEMAND VIRTUAL (OUTPATIENT)
Dept: URGENT CARE | Facility: CLINIC | Age: 33
End: 2023-12-18
Payer: COMMERCIAL

## 2023-12-18 DIAGNOSIS — J11.1 FLU: Primary | ICD-10-CM

## 2023-12-18 PROCEDURE — 99213 PR OFFICE/OUTPT VISIT, EST, LEVL III, 20-29 MIN: ICD-10-PCS | Mod: 95,,,

## 2023-12-18 PROCEDURE — 99213 OFFICE O/P EST LOW 20 MIN: CPT | Mod: 95,,,

## 2023-12-18 RX ORDER — BALOXAVIR MARBOXIL 80 MG/1
80 TABLET, FILM COATED ORAL ONCE
Qty: 1 TABLET | Refills: 0 | Status: SHIPPED | OUTPATIENT
Start: 2023-12-18 | End: 2023-12-18

## 2023-12-19 NOTE — PROGRESS NOTES
Subjective:      Patient ID: Jose Manuel Coronel is a 33 y.o. male.    Vitals:  vitals were not taken for this visit.     Chief Complaint: Sinus Problem      Visit Type: TELE AUDIOVISUAL    Present with the patient at the time of consultation:     Past Medical History:   Diagnosis Date    Anxiety     Depression     Genital herpes      History reviewed. No pertinent surgical history.  Review of patient's allergies indicates:  No Known Allergies  Current Outpatient Medications on File Prior to Visit   Medication Sig Dispense Refill    ALPRAZolam (XANAX) 0.25 MG tablet Use 1 tablet twice daily as needed for anxiety. 60 tablet 0    finasteride (PROPECIA) 1 mg tablet Take 1 tablet (1 mg total) by mouth once daily. 90 tablet 3    FLUoxetine 40 MG capsule TAKE 1 CAPSULE BY MOUTH EVERY DAY 30 capsule 3    rosuvastatin (CRESTOR) 20 MG tablet TAKE 1 TABLET BY MOUTH EVERY DAY 90 tablet 3     No current facility-administered medications on file prior to visit.     Family History   Problem Relation Age of Onset    Depression Mother     Anxiety disorder Mother     Depression Father     Diabetes Maternal Grandmother     Diabetes Maternal Grandfather     Diabetes Paternal Grandmother     Diabetes Paternal Grandfather        Medications Ordered                [id number] - [place] [address] AT [facility] [address] [address]    Telephone: [phone]   Fax: [phone]   Hours: Not open 24 hours                         E-Prescribed (1 of 1)              baloxavir marboxiL (XOFLUZA) 80 mg tablet    Sig: Take 1 tablet (80 mg total) by mouth once. for 1 dose       Start: 12/18/23     Quantity: 1 tablet Refills: 0                           [facility] Peq Odvv Intake    12/18/2023  6:47 PM CST - Filed by Patient   Describe your reason for todays visit Flu symtpoms and a son tested postive for flu   What is your current physical address in the event of a medical emergency?    Are you  able to take your vital signs? No   Please attach any relevant images or files          Patient states that his daughter has flu and he began to have body aches and headache at this time he believes he may be getting the flu patient denies any other symptoms         Constitution: Positive for chills.   Neck: neck negative.   Cardiovascular: Negative.    Gastrointestinal: Negative.    Endocrine: negative.   Genitourinary: Negative.    Musculoskeletal:  Positive for muscle ache.   Skin: Negative.    Allergic/Immunologic: Negative.    Neurological:  Positive for headaches.   Hematologic/Lymphatic: Negative.    Psychiatric/Behavioral: Negative.          Objective:   The physical exam was conducted virtually.  Physical Exam   Constitutional: He is oriented to person, place, and time.   HENT:   Head: Normocephalic and atraumatic.   Nose: Nose normal.   Eyes: Conjunctivae are normal. Pupils are equal, round, and reactive to light. Extraocular movement intact   Neck: Neck supple.   Pulmonary/Chest: Effort normal.   Abdominal: Normal appearance.   Musculoskeletal: Normal range of motion.         General: Normal range of motion.   Neurological: no focal deficit. He is alert, oriented to person, place, and time and at baseline.   Skin: Skin is warm.   Psychiatric: His behavior is normal. Mood, judgment and thought content normal.       Assessment:     1. Flu        Plan:       Flu  -     baloxavir marboxiL (XOFLUZA) 80 mg tablet; Take 1 tablet (80 mg total) by mouth once. for 1 dose  Dispense: 1 tablet; Refill: 0

## 2024-01-12 DIAGNOSIS — F41.1 GAD (GENERALIZED ANXIETY DISORDER): ICD-10-CM

## 2024-01-12 RX ORDER — FLUOXETINE HYDROCHLORIDE 40 MG/1
CAPSULE ORAL
Qty: 90 CAPSULE | Refills: 3 | Status: SHIPPED | OUTPATIENT
Start: 2024-01-12

## 2024-01-12 NOTE — TELEPHONE ENCOUNTER
Blasm for pt. Requesting he call office for assistance scheduling an office visit with Dr. Flower per med refill request.

## 2024-01-12 NOTE — TELEPHONE ENCOUNTER
Care Due:                  Date            Visit Type   Department     Provider  --------------------------------------------------------------------------------                                EP -                              PRIMARY      [facility] FAMILY  Last Visit: 11-      CARE ([facility])   MEDICINE       Tulio Cifuentes  Next Visit: None Scheduled  None         None Found                                                            Last  Test          Frequency    Reason                     Performed    Due Date  --------------------------------------------------------------------------------    Office Visit  15 months..  FLUoxetine, finasteride,   11- 02-                             rosuvastatin.............    CMP.........  12 months..  rosuvastatin.............  05-   05-    Lipid Panel.  12 months..  rosuvastatin.............  11- 11-    [facility] Embedded Care Due Messages. Reference number: [id number].   1/12/2024 6:15:18 AM CST

## 2024-01-17 ENCOUNTER — OFFICE VISIT (OUTPATIENT)
Dept: FAMILY MEDICINE | Facility: CLINIC | Age: 34
End: 2024-01-17
Payer: COMMERCIAL

## 2024-01-17 ENCOUNTER — DOCUMENTATION ONLY (OUTPATIENT)
Dept: FAMILY MEDICINE | Facility: CLINIC | Age: 34
End: 2024-01-17

## 2024-01-17 ENCOUNTER — LAB VISIT (OUTPATIENT)
Dept: LAB | Facility: HOSPITAL | Age: 34
End: 2024-01-17
Attending: FAMILY MEDICINE
Payer: COMMERCIAL

## 2024-01-17 VITALS
WEIGHT: 267.63 LBS | HEART RATE: 82 BPM | HEIGHT: 73 IN | SYSTOLIC BLOOD PRESSURE: 124 MMHG | OXYGEN SATURATION: 97 % | BODY MASS INDEX: 35.47 KG/M2 | DIASTOLIC BLOOD PRESSURE: 76 MMHG

## 2024-01-17 DIAGNOSIS — Z00.00 WELLNESS EXAMINATION: Primary | ICD-10-CM

## 2024-01-17 DIAGNOSIS — E66.01 SEVERE OBESITY (BMI 35.0-35.9 WITH COMORBIDITY): ICD-10-CM

## 2024-01-17 DIAGNOSIS — F41.1 GAD (GENERALIZED ANXIETY DISORDER): ICD-10-CM

## 2024-01-17 DIAGNOSIS — Z00.00 WELLNESS EXAMINATION: ICD-10-CM

## 2024-01-17 PROBLEM — E78.00 PURE HYPERCHOLESTEROLEMIA: Status: ACTIVE | Noted: 2024-01-17

## 2024-01-17 PROBLEM — L65.9 ALOPECIA: Status: ACTIVE | Noted: 2024-01-17

## 2024-01-17 LAB
ALBUMIN SERPL BCP-MCNC: 4.2 G/DL (ref 3.5–5.2)
ALP SERPL-CCNC: 47 U/L (ref 55–135)
ALT SERPL W/O P-5'-P-CCNC: 38 U/L (ref 10–44)
ANION GAP SERPL CALC-SCNC: 7 MMOL/L (ref 8–16)
AST SERPL-CCNC: 30 U/L (ref 10–40)
BASOPHILS # BLD AUTO: 0.04 K/UL (ref 0–0.2)
BASOPHILS NFR BLD: 0.6 % (ref 0–1.9)
BILIRUB SERPL-MCNC: 0.4 MG/DL (ref 0.1–1)
BUN SERPL-MCNC: 14 MG/DL (ref 6–20)
CALCIUM SERPL-MCNC: 9.9 MG/DL (ref 8.7–10.5)
CHLORIDE SERPL-SCNC: 105 MMOL/L (ref 95–110)
CHOLEST SERPL-MCNC: 189 MG/DL (ref 120–199)
CHOLEST/HDLC SERPL: 4.4 {RATIO} (ref 2–5)
CO2 SERPL-SCNC: 28 MMOL/L (ref 23–29)
CREAT SERPL-MCNC: 1.2 MG/DL (ref 0.5–1.4)
DIFFERENTIAL METHOD BLD: NORMAL
EOSINOPHIL # BLD AUTO: 0.1 K/UL (ref 0–0.5)
EOSINOPHIL NFR BLD: 1.3 % (ref 0–8)
ERYTHROCYTE [DISTWIDTH] IN BLOOD BY AUTOMATED COUNT: 12.2 % (ref 11.5–14.5)
EST. GFR  (NO RACE VARIABLE): >60 ML/MIN/1.73 M^2
ESTIMATED AVG GLUCOSE: 105 MG/DL (ref 68–131)
GLUCOSE SERPL-MCNC: 93 MG/DL (ref 70–110)
HBA1C MFR BLD: 5.3 % (ref 4–5.6)
HCT VFR BLD AUTO: 44.9 % (ref 40–54)
HDLC SERPL-MCNC: 43 MG/DL (ref 40–75)
HDLC SERPL: 22.8 % (ref 20–50)
HGB BLD-MCNC: 14.4 G/DL (ref 14–18)
IMM GRANULOCYTES # BLD AUTO: 0.01 K/UL (ref 0–0.04)
IMM GRANULOCYTES NFR BLD AUTO: 0.2 % (ref 0–0.5)
LDLC SERPL CALC-MCNC: 113.4 MG/DL (ref 63–159)
LYMPHOCYTES # BLD AUTO: 1.5 K/UL (ref 1–4.8)
LYMPHOCYTES NFR BLD: 24.2 % (ref 18–48)
MCH RBC QN AUTO: 28.5 PG (ref 27–31)
MCHC RBC AUTO-ENTMCNC: 32.1 G/DL (ref 32–36)
MCV RBC AUTO: 89 FL (ref 82–98)
MONOCYTES # BLD AUTO: 0.5 K/UL (ref 0.3–1)
MONOCYTES NFR BLD: 7.8 % (ref 4–15)
NEUTROPHILS # BLD AUTO: 4.2 K/UL (ref 1.8–7.7)
NEUTROPHILS NFR BLD: 65.9 % (ref 38–73)
NONHDLC SERPL-MCNC: 146 MG/DL
NRBC BLD-RTO: 0 /100 WBC
PLATELET # BLD AUTO: 248 K/UL (ref 150–450)
PMV BLD AUTO: 10.8 FL (ref 9.2–12.9)
POTASSIUM SERPL-SCNC: 4.8 MMOL/L (ref 3.5–5.1)
PROT SERPL-MCNC: 7.9 G/DL (ref 6–8.4)
RBC # BLD AUTO: 5.05 M/UL (ref 4.6–6.2)
SODIUM SERPL-SCNC: 140 MMOL/L (ref 136–145)
TRIGL SERPL-MCNC: 163 MG/DL (ref 30–150)
TSH SERPL DL<=0.005 MIU/L-ACNC: 0.56 UIU/ML (ref 0.4–4)
WBC # BLD AUTO: 6.31 K/UL (ref 3.9–12.7)

## 2024-01-17 PROCEDURE — 36415 COLL VENOUS BLD VENIPUNCTURE: CPT | Mod: PO | Performed by: FAMILY MEDICINE

## 2024-01-17 PROCEDURE — 3008F BODY MASS INDEX DOCD: CPT | Mod: CPTII,S$GLB,, | Performed by: FAMILY MEDICINE

## 2024-01-17 PROCEDURE — 3074F SYST BP LT 130 MM HG: CPT | Mod: CPTII,S$GLB,, | Performed by: FAMILY MEDICINE

## 2024-01-17 PROCEDURE — 1160F RVW MEDS BY RX/DR IN RCRD: CPT | Mod: CPTII,S$GLB,, | Performed by: FAMILY MEDICINE

## 2024-01-17 PROCEDURE — 3078F DIAST BP <80 MM HG: CPT | Mod: CPTII,S$GLB,, | Performed by: FAMILY MEDICINE

## 2024-01-17 PROCEDURE — 99999 PR PBB SHADOW E&M-EST. PATIENT-LVL IV: CPT | Mod: PBBFAC,,, | Performed by: FAMILY MEDICINE

## 2024-01-17 PROCEDURE — 99395 PREV VISIT EST AGE 18-39: CPT | Mod: EP,S$GLB,, | Performed by: FAMILY MEDICINE

## 2024-01-17 PROCEDURE — 84443 ASSAY THYROID STIM HORMONE: CPT | Performed by: FAMILY MEDICINE

## 2024-01-17 PROCEDURE — 83036 HEMOGLOBIN GLYCOSYLATED A1C: CPT | Performed by: FAMILY MEDICINE

## 2024-01-17 PROCEDURE — 1159F MED LIST DOCD IN RCRD: CPT | Mod: CPTII,S$GLB,, | Performed by: FAMILY MEDICINE

## 2024-01-17 PROCEDURE — 80061 LIPID PANEL: CPT | Performed by: FAMILY MEDICINE

## 2024-01-17 PROCEDURE — 85025 COMPLETE CBC W/AUTO DIFF WBC: CPT | Performed by: FAMILY MEDICINE

## 2024-01-17 PROCEDURE — 80053 COMPREHEN METABOLIC PANEL: CPT | Performed by: FAMILY MEDICINE

## 2024-01-17 RX ORDER — PHENTERMINE HYDROCHLORIDE 37.5 MG/1
37.5 TABLET ORAL
Qty: 90 TABLET | Refills: 1 | Status: SHIPPED | OUTPATIENT
Start: 2024-01-17 | End: 2024-01-25 | Stop reason: SDUPTHER

## 2024-01-17 NOTE — PROGRESS NOTES
"Subjective:       Patient ID: Jose Manuel Coronel is a 33 y.o. male.    Chief Complaint: Annual Exam    HPI:  Pleasant 33-year-old gentleman here today for his annual examination.  He has gained weight is at 35 BMI.  Do recommend lifestyle nutritional counseling and more exercise.  Also we are going to try phentermine and if that has not working he is willing to consider a G LP 1 agonist.  I will see him back in 3-6 months to decide on that.  Labs are due at this time.  Immunizations discussed.  Continue with therapy for hyperlipidemia and SALLY/Panic.  Continue the use of Xanax as needed.  The medications not as effective we can replace with a new prescription some point.  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    Review of Systems   Constitutional: Negative.    HENT: Negative.     Eyes: Negative.    Respiratory: Negative.     Cardiovascular: Negative.    Gastrointestinal: Negative.    Endocrine: Negative.    Genitourinary: Negative.    Musculoskeletal: Negative.    Skin: Negative.    Allergic/Immunologic: Negative.    Neurological: Negative.    Hematological: Negative.    Psychiatric/Behavioral: Negative.         Objective:      Vitals:    01/17/24 0845   BP: 124/76   Pulse: 82   SpO2: 97%   Weight: 121.4 kg (267 lb 10.2 oz)   Height: 6' 1" (1.854 m)      Physical Exam  Vitals and nursing note reviewed.   Constitutional:       Appearance: Normal appearance. He is obese.   HENT:      Head: Normocephalic and atraumatic.      Nose: Nose normal.      Mouth/Throat:      Mouth: Mucous membranes are moist.      Pharynx: Oropharynx is clear.   Eyes:      Extraocular Movements: Extraocular movements intact.      Conjunctiva/sclera: Conjunctivae normal.      Pupils: Pupils are equal, round, and reactive to light.   Cardiovascular:      Rate and Rhythm: Normal rate and regular rhythm.   Pulmonary:      Effort: Pulmonary effort is normal.      Breath sounds: Normal breath sounds.   Abdominal:      General: Abdomen is flat. Bowel " sounds are normal.      Palpations: Abdomen is soft.   Musculoskeletal:         General: Normal range of motion.      Cervical back: Normal range of motion and neck supple.   Skin:     General: Skin is warm and dry.      Capillary Refill: Capillary refill takes less than 2 seconds.   Neurological:      General: No focal deficit present.      Mental Status: He is alert and oriented to person, place, and time.   Psychiatric:         Mood and Affect: Mood normal.         Behavior: Behavior normal.         Thought Content: Thought content normal.         Judgment: Judgment normal.         Results for orders placed or performed in visit on 11/29/22   Lipid Panel   Result Value Ref Range    Cholesterol 178 120 - 199 mg/dL    Triglycerides 184 (H) 30 - 150 mg/dL    HDL 42 40 - 75 mg/dL    LDL Cholesterol 99.2 63.0 - 159.0 mg/dL    HDL/Cholesterol Ratio 23.6 20.0 - 50.0 %    Total Cholesterol/HDL Ratio 4.2 2.0 - 5.0    Non-HDL Cholesterol 136 mg/dL   Hemoglobin A1C   Result Value Ref Range    Hemoglobin A1C 5.3 4.0 - 5.6 %    Estimated Avg Glucose 105 68 - 131 mg/dL   Glucose, Fasting   Result Value Ref Range    Glucose, Fasting 95 70 - 110 mg/dL      Assessment:       1. Wellness examination    2. SALLY (generalized anxiety disorder)    3. Severe obesity (BMI 35.0-35.9 with comorbidity)        Plan:       Wellness examination  -     CBC Auto Differential; Future; Expected date: 01/17/2024  -     Comprehensive Metabolic Panel; Future; Expected date: 01/17/2024  -     Lipid Panel; Future; Expected date: 01/17/2024    SALLY (generalized anxiety disorder)  Comments:  Continue with the current plan.  Controlled medications approved for 6 months.    Severe obesity (BMI 35.0-35.9 with comorbidity)  -     Ambulatory referral/consult to Nutrition Services; Future; Expected date: 01/24/2024  -     Ambulatory Referral/Consult to Lifestyle Nutrition; Future; Expected date: 01/24/2024  -     phentermine (ADIPEX-P) 37.5 mg tablet; Take 1  tablet (37.5 mg total) by mouth before breakfast.  Dispense: 90 tablet; Refill: 1  -     Hemoglobin A1C; Future; Expected date: 01/17/2024  -     TSH; Future; Expected date: 01/17/2024          Medication List with Changes/Refills   New Medications    PHENTERMINE (ADIPEX-P) 37.5 MG TABLET    Take 1 tablet (37.5 mg total) by mouth before breakfast.   Current Medications    ALPRAZOLAM (XANAX) 0.25 MG TABLET    Use 1 tablet twice daily as needed for anxiety.    FINASTERIDE (PROPECIA) 1 MG TABLET    Take 1 tablet (1 mg total) by mouth once daily.    FLUOXETINE 40 MG CAPSULE    TAKE 1 CAPSULE BY MOUTH EVERY DAY    ROSUVASTATIN (CRESTOR) 20 MG TABLET    TAKE 1 TABLET BY MOUTH EVERY DAY

## 2024-01-17 NOTE — PATIENT INSTRUCTIONS
Ask your pharmacist about the tetanus vaccine Tdap, flu shot, and completion of the COVID-19 vaccine series if you choose.

## 2024-01-25 ENCOUNTER — PATIENT MESSAGE (OUTPATIENT)
Dept: FAMILY MEDICINE | Facility: CLINIC | Age: 34
End: 2024-01-25
Payer: COMMERCIAL

## 2024-01-25 DIAGNOSIS — E66.01 SEVERE OBESITY (BMI 35.0-35.9 WITH COMORBIDITY): ICD-10-CM

## 2024-01-25 RX ORDER — PHENTERMINE HYDROCHLORIDE 37.5 MG/1
37.5 TABLET ORAL
Qty: 90 TABLET | Refills: 1 | Status: SHIPPED | OUTPATIENT
Start: 2024-01-25 | End: 2024-06-12

## 2024-01-26 NOTE — TELEPHONE ENCOUNTER
Refill Routing Note   Medication(s) are not appropriate for processing by [facility] for the following reason(s):        Outside of protocol    [facility] action(s):  Route               Appointments  past 12m or future 3m with PCP    Date Provider   Last Visit   1/17/2024 Fernando Flower MD   Next Visit   Visit date not found Fernando Flower MD   ED visits in past 90 days: 0        Note composed:7:55 PM 01/25/2024

## 2024-03-08 ENCOUNTER — TELEPHONE (OUTPATIENT)
Dept: FAMILY MEDICINE | Facility: CLINIC | Age: 34
End: 2024-03-08
Payer: COMMERCIAL

## 2024-03-08 NOTE — TELEPHONE ENCOUNTER
Unable to lvm for pt. Requesting he call office to confirm need for held appt. With Dr. Flower on 4/17/24 at 8 AM.

## 2024-04-16 ENCOUNTER — TELEPHONE (OUTPATIENT)
Dept: FAMILY MEDICINE | Facility: CLINIC | Age: 34
End: 2024-04-16
Payer: COMMERCIAL

## 2024-06-12 ENCOUNTER — PATIENT MESSAGE (OUTPATIENT)
Dept: FAMILY MEDICINE | Facility: CLINIC | Age: 34
End: 2024-06-12

## 2024-06-12 ENCOUNTER — OFFICE VISIT (OUTPATIENT)
Dept: FAMILY MEDICINE | Facility: CLINIC | Age: 34
End: 2024-06-12
Payer: COMMERCIAL

## 2024-06-12 ENCOUNTER — PATIENT MESSAGE (OUTPATIENT)
Dept: PSYCHIATRY | Facility: CLINIC | Age: 34
End: 2024-06-12
Payer: COMMERCIAL

## 2024-06-12 VITALS
HEART RATE: 83 BPM | DIASTOLIC BLOOD PRESSURE: 78 MMHG | HEIGHT: 73 IN | OXYGEN SATURATION: 97 % | BODY MASS INDEX: 35.24 KG/M2 | SYSTOLIC BLOOD PRESSURE: 122 MMHG | WEIGHT: 265.88 LBS

## 2024-06-12 DIAGNOSIS — F41.1 GAD (GENERALIZED ANXIETY DISORDER): Primary | ICD-10-CM

## 2024-06-12 DIAGNOSIS — B36.0 TINEA VERSICOLOR: Primary | ICD-10-CM

## 2024-06-12 DIAGNOSIS — E66.01 SEVERE OBESITY (BMI 35.0-35.9 WITH COMORBIDITY): ICD-10-CM

## 2024-06-12 DIAGNOSIS — B34.9 ACUTE VIRAL SYNDROME: ICD-10-CM

## 2024-06-12 PROCEDURE — 1159F MED LIST DOCD IN RCRD: CPT | Mod: CPTII,S$GLB,, | Performed by: FAMILY MEDICINE

## 2024-06-12 PROCEDURE — 1160F RVW MEDS BY RX/DR IN RCRD: CPT | Mod: CPTII,S$GLB,, | Performed by: FAMILY MEDICINE

## 2024-06-12 PROCEDURE — 3078F DIAST BP <80 MM HG: CPT | Mod: CPTII,S$GLB,, | Performed by: FAMILY MEDICINE

## 2024-06-12 PROCEDURE — 3008F BODY MASS INDEX DOCD: CPT | Mod: CPTII,S$GLB,, | Performed by: FAMILY MEDICINE

## 2024-06-12 PROCEDURE — 3044F HG A1C LEVEL LT 7.0%: CPT | Mod: CPTII,S$GLB,, | Performed by: FAMILY MEDICINE

## 2024-06-12 PROCEDURE — 99214 OFFICE O/P EST MOD 30 MIN: CPT | Mod: S$GLB,,, | Performed by: FAMILY MEDICINE

## 2024-06-12 PROCEDURE — 3074F SYST BP LT 130 MM HG: CPT | Mod: CPTII,S$GLB,, | Performed by: FAMILY MEDICINE

## 2024-06-12 PROCEDURE — 99999 PR PBB SHADOW E&M-EST. PATIENT-LVL III: CPT | Mod: PBBFAC,,, | Performed by: FAMILY MEDICINE

## 2024-06-12 RX ORDER — TERBINAFINE HYDROCHLORIDE 250 MG/1
250 TABLET ORAL DAILY
Qty: 30 TABLET | Refills: 0 | Status: SHIPPED | OUTPATIENT
Start: 2024-06-12 | End: 2024-09-10

## 2024-06-12 RX ORDER — CLONAZEPAM 1 MG/1
1 TABLET ORAL 2 TIMES DAILY PRN
Qty: 60 TABLET | Refills: 2 | Status: SHIPPED | OUTPATIENT
Start: 2024-06-12 | End: 2025-06-12

## 2024-06-12 NOTE — PROGRESS NOTES
"Subjective:       Patient ID: Jose Manuel Coronel is a 34 y.o. male.    Chief Complaint: med check    HPI:  Pleasant 34-year-old gentleman here today for viral syndrome symptoms that occurred last week.  He had fevers and chills and some myalgias.  He also noticed some lymph node enlargement in his neck and throat area.  Today's feeling better.  Also has anxiety cranked up.  He had been well controlled with mild sexual side effects on fluoxetine at 40 mg daily.  In the past he has been clonazepam.  He is used Xanax very sparingly.  It does help but it simply isn't long acting enough.  I do recommend Psychiatry to evaluate his current treatment plan and suggest perhaps other meds and perhaps cognitive behavioral therapy might be helpful as well.  That referral was placed.  In the meantime clonazepam 1 mg b.i.d. p.r.n. anxiety be helpful.  Denies suicidal intentions or plans.  He is primarily dealing with anxiety at this point.  Follow up with me in 3 months.    Review of Systems   Constitutional: Negative.    HENT: Negative.     Eyes: Negative.    Respiratory: Negative.     Cardiovascular: Negative.    Gastrointestinal: Negative.    Endocrine: Negative.    Genitourinary: Negative.    Musculoskeletal: Negative.    Skin: Negative.    Allergic/Immunologic: Negative.    Neurological: Negative.    Hematological: Negative.    Psychiatric/Behavioral: Negative.         Objective:      Vitals:    06/12/24 0834   BP: 122/78   Pulse: 83   SpO2: 97%   Weight: 120.6 kg (265 lb 14 oz)   Height: 6' 1" (1.854 m)      Physical Exam  Vitals and nursing note reviewed.   Constitutional:       Appearance: Normal appearance. He is obese.   HENT:      Head: Normocephalic and atraumatic.      Nose: Nose normal.      Mouth/Throat:      Mouth: Mucous membranes are moist.      Pharynx: Oropharynx is clear.   Eyes:      Extraocular Movements: Extraocular movements intact.      Conjunctiva/sclera: Conjunctivae normal.      Pupils: Pupils are equal, " round, and reactive to light.   Cardiovascular:      Rate and Rhythm: Normal rate and regular rhythm.   Pulmonary:      Effort: Pulmonary effort is normal.      Breath sounds: Normal breath sounds.   Abdominal:      General: Abdomen is flat. Bowel sounds are normal.      Palpations: Abdomen is soft.   Musculoskeletal:         General: Normal range of motion.      Cervical back: Normal range of motion and neck supple.   Skin:     General: Skin is warm and dry.      Capillary Refill: Capillary refill takes less than 2 seconds.   Neurological:      General: No focal deficit present.      Mental Status: He is alert and oriented to person, place, and time.   Psychiatric:         Mood and Affect: Mood normal.         Behavior: Behavior normal.         Thought Content: Thought content normal.         Judgment: Judgment normal.         Results for orders placed or performed in visit on 01/17/24   Hemoglobin A1C   Result Value Ref Range    Hemoglobin A1C 5.3 4.0 - 5.6 %    Estimated Avg Glucose 105 68 - 131 mg/dL   CBC Auto Differential   Result Value Ref Range    WBC 6.31 3.90 - 12.70 K/uL    RBC 5.05 4.60 - 6.20 M/uL    Hemoglobin 14.4 14.0 - 18.0 g/dL    Hematocrit 44.9 40.0 - 54.0 %    MCV 89 82 - 98 fL    MCH 28.5 27.0 - 31.0 pg    MCHC 32.1 32.0 - 36.0 g/dL    RDW 12.2 11.5 - 14.5 %    Platelets 248 150 - 450 K/uL    MPV 10.8 9.2 - 12.9 fL    Immature Granulocytes 0.2 0.0 - 0.5 %    Gran # (ANC) 4.2 1.8 - 7.7 K/uL    Immature Grans (Abs) 0.01 0.00 - 0.04 K/uL    Lymph # 1.5 1.0 - 4.8 K/uL    Mono # 0.5 0.3 - 1.0 K/uL    Eos # 0.1 0.0 - 0.5 K/uL    Baso # 0.04 0.00 - 0.20 K/uL    nRBC 0 0 /100 WBC    Gran % 65.9 38.0 - 73.0 %    Lymph % 24.2 18.0 - 48.0 %    Mono % 7.8 4.0 - 15.0 %    Eosinophil % 1.3 0.0 - 8.0 %    Basophil % 0.6 0.0 - 1.9 %    Differential Method Automated    Comprehensive Metabolic Panel   Result Value Ref Range    Sodium 140 136 - 145 mmol/L    Potassium 4.8 3.5 - 5.1 mmol/L    Chloride 105 95 - 110  mmol/L    CO2 28 23 - 29 mmol/L    Glucose 93 70 - 110 mg/dL    BUN 14 6 - 20 mg/dL    Creatinine 1.2 0.5 - 1.4 mg/dL    Calcium 9.9 8.7 - 10.5 mg/dL    Total Protein 7.9 6.0 - 8.4 g/dL    Albumin 4.2 3.5 - 5.2 g/dL    Total Bilirubin 0.4 0.1 - 1.0 mg/dL    Alkaline Phosphatase 47 (L) 55 - 135 U/L    AST 30 10 - 40 U/L    ALT 38 10 - 44 U/L    eGFR >60.0 >60 mL/min/1.73 m^2    Anion Gap 7 (L) 8 - 16 mmol/L   Lipid Panel   Result Value Ref Range    Cholesterol 189 120 - 199 mg/dL    Triglycerides 163 (H) 30 - 150 mg/dL    HDL 43 40 - 75 mg/dL    LDL Cholesterol 113.4 63.0 - 159.0 mg/dL    HDL/Cholesterol Ratio 22.8 20.0 - 50.0 %    Total Cholesterol/HDL Ratio 4.4 2.0 - 5.0    Non-HDL Cholesterol 146 mg/dL   TSH   Result Value Ref Range    TSH 0.562 0.400 - 4.000 uIU/mL      Assessment:       1. SALLY (generalized anxiety disorder)    2. Acute viral syndrome    3. Severe obesity (BMI 35.0-35.9 with comorbidity)        Plan:       SALLY (generalized anxiety disorder)  -     Ambulatory referral/consult to Psychiatry; Future; Expected date: 06/19/2024  -     clonazePAM (KLONOPIN) 1 MG tablet; Take 1 tablet (1 mg total) by mouth 2 (two) times daily as needed for Anxiety.  Dispense: 60 tablet; Refill: 2    Acute viral syndrome  Comments:  Monitor progress.    Severe obesity (BMI 35.0-35.9 with comorbidity)  Comments:  Outpatient Zepbound.          Medication List with Changes/Refills   New Medications    CLONAZEPAM (KLONOPIN) 1 MG TABLET    Take 1 tablet (1 mg total) by mouth 2 (two) times daily as needed for Anxiety.   Current Medications    FINASTERIDE (PROPECIA) 1 MG TABLET    Take 1 tablet (1 mg total) by mouth once daily.    FLUOXETINE 40 MG CAPSULE    TAKE 1 CAPSULE BY MOUTH EVERY DAY    ROSUVASTATIN (CRESTOR) 20 MG TABLET    TAKE 1 TABLET BY MOUTH EVERY DAY   Discontinued Medications    ALPRAZOLAM (XANAX) 0.25 MG TABLET    Use 1 tablet twice daily as needed for anxiety.    PHENTERMINE (ADIPEX-P) 37.5 MG TABLET    Take 1  tablet (37.5 mg total) by mouth before breakfast.

## 2024-06-14 DIAGNOSIS — E78.00 PURE HYPERCHOLESTEROLEMIA: ICD-10-CM

## 2024-06-14 RX ORDER — ROSUVASTATIN CALCIUM 20 MG/1
TABLET, COATED ORAL
Qty: 90 TABLET | Refills: 2 | Status: SHIPPED | OUTPATIENT
Start: 2024-06-14

## 2024-06-14 NOTE — TELEPHONE ENCOUNTER
Refill Decision Note   Jose Manuel Berna  is requesting a refill authorization.    Brief Assessment and Rationale for Refill:   Approve       Medication Therapy Plan:          Comments:     Note composed:7:16 AM 06/14/2024

## 2024-06-14 NOTE — TELEPHONE ENCOUNTER
No care due was identified.  Health [facility] Embedded Care Due Messages. Reference number: [id number].   6/14/2024 6:12:07 AM CDT

## 2024-07-15 ENCOUNTER — OFFICE VISIT (OUTPATIENT)
Dept: FAMILY MEDICINE | Facility: CLINIC | Age: 34
End: 2024-07-15
Payer: COMMERCIAL

## 2024-07-15 VITALS
HEART RATE: 93 BPM | OXYGEN SATURATION: 96 % | WEIGHT: 261 LBS | SYSTOLIC BLOOD PRESSURE: 118 MMHG | BODY MASS INDEX: 34.59 KG/M2 | DIASTOLIC BLOOD PRESSURE: 68 MMHG | HEIGHT: 73 IN | TEMPERATURE: 99 F

## 2024-07-15 DIAGNOSIS — J06.9 UPPER RESPIRATORY TRACT INFECTION, UNSPECIFIED TYPE: Primary | ICD-10-CM

## 2024-07-15 DIAGNOSIS — R50.9 FEVER, UNSPECIFIED FEVER CAUSE: ICD-10-CM

## 2024-07-15 LAB
CTP QC/QA: YES
MOLECULAR STREP A: NEGATIVE
POC MOLECULAR INFLUENZA A AGN: NEGATIVE
POC MOLECULAR INFLUENZA B AGN: NEGATIVE
SARS-COV-2 RDRP RESP QL NAA+PROBE: NEGATIVE

## 2024-07-15 PROCEDURE — 3074F SYST BP LT 130 MM HG: CPT | Mod: CPTII,S$GLB,, | Performed by: FAMILY MEDICINE

## 2024-07-15 PROCEDURE — 3078F DIAST BP <80 MM HG: CPT | Mod: CPTII,S$GLB,, | Performed by: FAMILY MEDICINE

## 2024-07-15 PROCEDURE — 3008F BODY MASS INDEX DOCD: CPT | Mod: CPTII,S$GLB,, | Performed by: FAMILY MEDICINE

## 2024-07-15 PROCEDURE — 99999 PR PBB SHADOW E&M-EST. PATIENT-LVL III: CPT | Mod: PBBFAC,,, | Performed by: FAMILY MEDICINE

## 2024-07-15 PROCEDURE — 87635 SARS-COV-2 COVID-19 AMP PRB: CPT | Mod: QW,S$GLB,, | Performed by: FAMILY MEDICINE

## 2024-07-15 PROCEDURE — 87502 INFLUENZA DNA AMP PROBE: CPT | Mod: QW,S$GLB,, | Performed by: FAMILY MEDICINE

## 2024-07-15 PROCEDURE — 3044F HG A1C LEVEL LT 7.0%: CPT | Mod: CPTII,S$GLB,, | Performed by: FAMILY MEDICINE

## 2024-07-15 PROCEDURE — 1159F MED LIST DOCD IN RCRD: CPT | Mod: CPTII,S$GLB,, | Performed by: FAMILY MEDICINE

## 2024-07-15 PROCEDURE — 99213 OFFICE O/P EST LOW 20 MIN: CPT | Mod: S$GLB,,, | Performed by: FAMILY MEDICINE

## 2024-07-15 PROCEDURE — 87651 STREP A DNA AMP PROBE: CPT | Mod: QW,S$GLB,, | Performed by: FAMILY MEDICINE

## 2024-07-15 RX ORDER — TIRZEPATIDE 2.5 MG/.5ML
2.5 INJECTION, SOLUTION SUBCUTANEOUS WEEKLY
COMMUNITY
Start: 2024-06-14

## 2024-07-15 NOTE — PROGRESS NOTES
Subjective:       Patient ID: Jose Manuel Coronel is a 34 y.o. male.    Chief Complaint: Fatigue, Headache, Cough, and Sore Throat (Symptoms started on 7/13/24. Coughing up green mucus. Fever. Household has : Strep throats, Fifths, Hand foot and mouth x 1-2 weeks)    Here as new patient for acute visit.  Symptoms x 2 days.  Temp up to 102 last night midnight.      Fatigue  Associated symptoms include coughing, fatigue, headaches and a sore throat. Pertinent negatives include no chest pain, chills or fever.   Headache   Associated symptoms include coughing and a sore throat. Pertinent negatives include no fever.   Cough  Associated symptoms include headaches and a sore throat. Pertinent negatives include no chest pain, chills, fever or shortness of breath.   Sore Throat   Associated symptoms include coughing and headaches. Pertinent negatives include no shortness of breath.     Review of Systems   Constitutional:  Positive for fatigue. Negative for chills and fever.   HENT:  Positive for sore throat.    Respiratory:  Positive for cough. Negative for chest tightness and shortness of breath.    Cardiovascular:  Negative for chest pain, palpitations and leg swelling.   Endocrine: Negative for cold intolerance and heat intolerance.   Neurological:  Positive for headaches.   Psychiatric/Behavioral:  Negative for decreased concentration. The patient is not nervous/anxious.        Objective:      Physical Exam  Vitals and nursing note reviewed.   Constitutional:       Appearance: He is well-developed.   HENT:      Head: Normocephalic and atraumatic.   Cardiovascular:      Rate and Rhythm: Normal rate and regular rhythm.      Heart sounds: Normal heart sounds.   Pulmonary:      Effort: Pulmonary effort is normal.      Breath sounds: Normal breath sounds.   Psychiatric:         Mood and Affect: Mood normal.         Behavior: Behavior normal.         Assessment:       1. Upper respiratory tract infection, unspecified type    2. Fever,  unspecified fever cause        Plan:       Upper respiratory tract infection, unspecified type    Fever, unspecified fever cause  -     POCT Influenza A/B Molecular  -     POCT COVID-19 Rapid Screening  -     POCT Strep A, Molecular      Test negative  Supportive care discussed as well as return precautions.    Follow up if symptoms worsen or fail to improve.

## 2024-07-16 ENCOUNTER — PATIENT MESSAGE (OUTPATIENT)
Dept: FAMILY MEDICINE | Facility: CLINIC | Age: 34
End: 2024-07-16
Payer: COMMERCIAL

## 2024-07-17 ENCOUNTER — PATIENT MESSAGE (OUTPATIENT)
Dept: FAMILY MEDICINE | Facility: CLINIC | Age: 34
End: 2024-07-17
Payer: COMMERCIAL

## 2024-07-17 RX ORDER — DOXYCYCLINE 100 MG/1
100 CAPSULE ORAL 2 TIMES DAILY
Qty: 14 CAPSULE | Refills: 0 | Status: SHIPPED | OUTPATIENT
Start: 2024-07-17 | End: 2024-07-24

## 2024-10-03 ENCOUNTER — OFFICE VISIT (OUTPATIENT)
Dept: FAMILY MEDICINE | Facility: CLINIC | Age: 34
End: 2024-10-03
Payer: COMMERCIAL

## 2024-10-03 VITALS
WEIGHT: 241.94 LBS | HEIGHT: 75 IN | RESPIRATION RATE: 18 BRPM | OXYGEN SATURATION: 97 % | DIASTOLIC BLOOD PRESSURE: 70 MMHG | HEART RATE: 77 BPM | BODY MASS INDEX: 30.08 KG/M2 | SYSTOLIC BLOOD PRESSURE: 120 MMHG

## 2024-10-03 DIAGNOSIS — E66.811 OBESITY (BMI 30.0-34.9): ICD-10-CM

## 2024-10-03 DIAGNOSIS — J02.9 PHARYNGITIS, UNSPECIFIED ETIOLOGY: Primary | ICD-10-CM

## 2024-10-03 DIAGNOSIS — B36.0 TINEA VERSICOLOR: ICD-10-CM

## 2024-10-03 DIAGNOSIS — L65.9 ALOPECIA: ICD-10-CM

## 2024-10-03 PROCEDURE — 3074F SYST BP LT 130 MM HG: CPT | Mod: CPTII,S$GLB,, | Performed by: FAMILY MEDICINE

## 2024-10-03 PROCEDURE — 99999 PR PBB SHADOW E&M-EST. PATIENT-LVL III: CPT | Mod: PBBFAC,,, | Performed by: FAMILY MEDICINE

## 2024-10-03 PROCEDURE — 3078F DIAST BP <80 MM HG: CPT | Mod: CPTII,S$GLB,, | Performed by: FAMILY MEDICINE

## 2024-10-03 PROCEDURE — 1160F RVW MEDS BY RX/DR IN RCRD: CPT | Mod: CPTII,S$GLB,, | Performed by: FAMILY MEDICINE

## 2024-10-03 PROCEDURE — 3044F HG A1C LEVEL LT 7.0%: CPT | Mod: CPTII,S$GLB,, | Performed by: FAMILY MEDICINE

## 2024-10-03 PROCEDURE — 99214 OFFICE O/P EST MOD 30 MIN: CPT | Mod: S$GLB,,, | Performed by: FAMILY MEDICINE

## 2024-10-03 PROCEDURE — 3008F BODY MASS INDEX DOCD: CPT | Mod: CPTII,S$GLB,, | Performed by: FAMILY MEDICINE

## 2024-10-03 PROCEDURE — 1159F MED LIST DOCD IN RCRD: CPT | Mod: CPTII,S$GLB,, | Performed by: FAMILY MEDICINE

## 2024-10-03 RX ORDER — TIRZEPATIDE 5 MG/.5ML
5 INJECTION, SOLUTION SUBCUTANEOUS
COMMUNITY

## 2024-10-03 RX ORDER — METHYLPREDNISOLONE 4 MG/1
TABLET ORAL
Qty: 21 EACH | Refills: 0 | Status: SHIPPED | OUTPATIENT
Start: 2024-10-03 | End: 2024-10-24

## 2024-10-03 RX ORDER — FINASTERIDE 1 MG/1
1 TABLET, FILM COATED ORAL DAILY
Qty: 90 TABLET | Refills: 3 | Status: SHIPPED | OUTPATIENT
Start: 2024-10-03

## 2024-10-03 RX ORDER — FLUCONAZOLE 150 MG/1
150 TABLET ORAL DAILY
Qty: 14 TABLET | Refills: 2 | Status: SHIPPED | OUTPATIENT
Start: 2024-10-03 | End: 2024-11-14

## 2024-10-03 NOTE — PROGRESS NOTES
Patient ID: Jose Manuel Coronel is a 34 y.o. male.    Chief Complaint: Follow-up and Sore Throat (Twins have hand foot and moth. Oldest son tested positive for flu A. All his test came back negative. )    History of Present Illness    CHIEF COMPLAINT:  Jose Manuel presents today for pharyngitis and tenderness at the base of the tongue bilaterally.    CURRENT SYMPTOMS:  He reports very painful tenderness at the base of the tongue bilaterally. He denies experiencing intense systemic symptoms, dysphagia, or fever.    FAMILY HISTORY:  His twin sons have hand, foot, and mouth disease. His oldest son has tested positive for flu.    MEDICATIONS:  He is currently taking Propecia (finasteride) for alopecia, which he is tolerating well and needs a refill. He is on Trepidro for weight management and Mongero 5 mg weekly.    SKIN CONDITIONS:  He has a history of alopecia managed with finasteride. His tinea versicolor has returned, but denies any new or worsening symptoms related to these skin conditions.    WEIGHT MANAGEMENT:  He reports successful weight loss of approximately 20 lbs with Trepidro use. He expresses feeling better with this weight loss. His current BMI is 30.2.    PAST MEDICAL TESTS:  He reports that all previous medical tests have come back negative.      ROS:  General: -fever, -chills, -fatigue, -weight gain, +weight loss  Eyes: -vision changes, -redness, -discharge  ENT: -ear pain, -nasal congestion, +sore throat  Cardiovascular: -chest pain, -palpitations, -lower extremity edema  Respiratory: -cough, -shortness of breath  Gastrointestinal: -abdominal pain, -nausea, -vomiting, -diarrhea, -constipation, -blood in stool  Genitourinary: -dysuria, -hematuria, -frequency  Musculoskeletal: -joint pain, +muscle pain  Skin: -rash, -lesion  Neurological: -headache, -dizziness, -numbness, -tingling  Psychiatric: -anxiety, -depression, -sleep difficulty         Physical Exam    General: No acute distress. Well-developed.  Well-nourished.  Eyes: EOMI. Sclerae anicteric.  HENT: Normocephalic. Atraumatic. Nares patent. Moist oral mucosa.  Ears: Bilateral TMs clear. Bilateral EACs clear.  Cardiovascular: Regular rate. Regular rhythm. No murmurs. No rubs. No gallops. Normal S1, S2.  Respiratory: Normal respiratory effort. Clear to auscultation bilaterally. No rales. No rhonchi. No wheezing.  Abdomen: Soft. Non-tender. Non-distended. Normoactive bowel sounds.  Musculoskeletal: No  obvious deformity.  Extremities: No lower extremity edema.  Neurological: Alert & oriented x3. No slurred speech. Normal gait.  Psychiatric: Normal mood. Normal affect. Good insight. Good judgment.  Skin: Warm. Dry. No rash.  Mouth: Possible swelling at the base of the tongue. Possible lymph tissue at the base of the tongue.         Assessment & Plan    Assessed pharyngitis with tenderness at base of tongue bilaterally; noted possible swelling and lymph tissue  Ruled out need for antibiotics based on absence of intense systemic symptoms, dysphagia, and fever  Considered family members' recent illnesses (hand, foot, and mouth disease; flu) in differential diagnosis  Evaluated ongoing management of alopecia and tinea versicolor  Reviewed weight loss progress with Trepidro, noting current BMI of 30.2    TINEA VERSICOLOR:  - Restarted Nizoral shampoo to be used 2-3 times per week.  - Prescribed Diflucan 150 mg daily for 2 weeks with a few refills.  - Noted that the patient's tinea versicolor has returned.  - Advised the patient that this condition is difficult to completely eradicate.    PHARYNGITIS:  - Prescribed Medrol Dosepak to be taken in the early part of the day with a meal.  - Observed pharyngitis and tenderness at the base of the patient's tongue bilaterally.  - Noted that the patient does not have intense systemic symptoms or dysphagia, and is afebrile.  - Examination revealed possible swelling at the base of the tongue and some lymph tissue, which is very  painful.  - All tests came back negative for the patient.  - Determined that no antibiotics are needed at this time.    WEIGHT MANAGEMENT:  - Continued Trepidro for weight loss; the patient is currently on a 5 mg per week dose of Mongero.  - Noted that the patient has lost approximately 20 lbs and is feeling much better.  - Calculated the patient's current BMI as 30.2.  - Suggested that losing another 10 lbs would be ideal.  - Continued the current 5 mg per week dose of Mongero for weight management.  - Jose Manuel to continue weight loss efforts; aim for approximately 10 more lbs of weight loss.    MEDICATIONS/SUPPLEMENTS:  - Continued finasteride treatment and provided a refill.  - Noted that the patient has been tolerating the medication well.    FOLLOW UP:  - Follow up in 3 months.              No follow-ups on file.    This note was generated with the assistance of ambient listening technology. Verbal consent was obtained by the patient and accompanying visitor(s) for the recording of patient appointment to facilitate this note. I attest to having reviewed and edited the generated note for accuracy, though some syntax or spelling errors may persist. Please contact the author of this note for any clarification.

## 2025-01-16 DIAGNOSIS — F41.1 GAD (GENERALIZED ANXIETY DISORDER): ICD-10-CM

## 2025-01-16 RX ORDER — FLUOXETINE HYDROCHLORIDE 40 MG/1
CAPSULE ORAL
Qty: 90 CAPSULE | Refills: 2 | Status: SHIPPED | OUTPATIENT
Start: 2025-01-16

## 2025-01-16 NOTE — TELEPHONE ENCOUNTER
Provider Staff:  Action required for this patient    Requires labs      Please see care gap opportunities below in Care Due Message.    Thanks!  [facility]     Appointments      Date Provider   Last Visit   10/3/2024 Fernando Flower MD   Next Visit   2/19/2025 Fernando Flower MD     Refill Decision Note   Jose Manuel Coronel  is requesting a refill authorization.  Brief Assessment and Rationale for Refill:  Approve     Medication Therapy Plan:        Comments:     Note composed:6:36 AM 01/16/2025

## 2025-01-16 NOTE — TELEPHONE ENCOUNTER
Care Due:                  Date            Visit Type   Department     Provider  --------------------------------------------------------------------------------                                SAME DAY -                              ESTABLISHED   [facility]  Last Visit: 10-      PATIENT      MEDICINE       Fernando Flower                              EP -                              PRIMARY      [facility]  Next Visit: 02-      CARE ([facility])   MEDICINE       Fernando Flower                                                            Last  Test          Frequency    Reason                     Performed    Due Date  --------------------------------------------------------------------------------    CMP.........  12 months..  rosuvastatin.............  01- 01-    Lipid Panel.  12 months..  rosuvastatin.............  01- 01-    [employer] Embedded Care Due Messages. Reference number: [id number].   1/16/2025 6:13:53 AM CST

## 2025-04-14 DIAGNOSIS — E78.00 PURE HYPERCHOLESTEROLEMIA: ICD-10-CM

## 2025-04-14 RX ORDER — ROSUVASTATIN CALCIUM 20 MG/1
20 TABLET, COATED ORAL
Qty: 90 TABLET | Refills: 3 | Status: SHIPPED | OUTPATIENT
Start: 2025-04-14

## 2025-04-14 NOTE — TELEPHONE ENCOUNTER
Care Due:                  Date            Visit Type   Department     Provider  --------------------------------------------------------------------------------                                SAME DAY -                              ESTABLISHED   [facility]  Last Visit: 10-      PATIENT      MEDICINE       Fernando Flower  Next Visit: None Scheduled  None         None Found                                                            Last  Test          Frequency    Reason                     Performed    Due Date  --------------------------------------------------------------------------------    CMP.........  12 months..  rosuvastatin.............  01- 01-    Lipid Panel.  12 months..  rosuvastatin.............  01- 01-    [employer] Embedded Care Due Messages. Reference number: [id number].   4/14/2025 6:02:09 AM CDT

## 2025-04-14 NOTE — TELEPHONE ENCOUNTER
Refill Routing Note   Medication(s) are not appropriate for processing by [facility] for the following reason(s):        Required labs outdated    [facility] action(s):  Defer   Requires labs : Yes             Appointments  past 12m or future 3m with PCP    Date Provider   Last Visit   10/3/2024 Fernando Flower MD   Next Visit   Visit date not found Fernando Flower MD   ED visits in past 90 days: 0        Note composed:11:58 AM 04/14/2025